# Patient Record
Sex: FEMALE | Race: WHITE | NOT HISPANIC OR LATINO | Employment: UNEMPLOYED | URBAN - METROPOLITAN AREA
[De-identification: names, ages, dates, MRNs, and addresses within clinical notes are randomized per-mention and may not be internally consistent; named-entity substitution may affect disease eponyms.]

---

## 2022-11-29 ENCOUNTER — OFFICE VISIT (OUTPATIENT)
Dept: ENDOCRINOLOGY | Facility: CLINIC | Age: 82
End: 2022-11-29

## 2022-11-29 VITALS
OXYGEN SATURATION: 98 % | BODY MASS INDEX: 23.05 KG/M2 | HEIGHT: 64 IN | WEIGHT: 135 LBS | DIASTOLIC BLOOD PRESSURE: 60 MMHG | HEART RATE: 62 BPM | SYSTOLIC BLOOD PRESSURE: 130 MMHG

## 2022-11-29 DIAGNOSIS — E06.3 HASHIMOTO'S THYROIDITIS: Primary | ICD-10-CM

## 2022-11-29 PROCEDURE — 99204 OFFICE O/P NEW MOD 45 MIN: CPT | Performed by: INTERNAL MEDICINE

## 2022-11-29 RX ORDER — FAMOTIDINE 20 MG/1
TABLET, FILM COATED ORAL
COMMUNITY
Start: 2022-11-08

## 2022-11-29 RX ORDER — LEVOTHYROXINE SODIUM 0.05 MG/1
50 TABLET ORAL DAILY
Qty: 90 TABLET | Refills: 3 | Status: SHIPPED | OUTPATIENT
Start: 2022-11-29 | End: 2023-03-31

## 2022-11-29 RX ORDER — LEVOCETIRIZINE DIHYDROCHLORIDE 5 MG/1
TABLET, FILM COATED ORAL
COMMUNITY
Start: 2022-10-04

## 2022-11-29 RX ORDER — MONTELUKAST SODIUM 10 MG/1
TABLET ORAL
COMMUNITY
Start: 2022-11-08 | End: 2023-03-31

## 2022-11-29 RX ORDER — NIFEDIPINE 30 MG/1
TABLET, EXTENDED RELEASE ORAL
COMMUNITY
Start: 2022-11-08

## 2022-11-29 NOTE — ASSESSMENT & PLAN NOTE
She has Hashimoto's thyroiditis that may be contributing to the urticaria.  She has TSH of 4 with low free T4.  We discussed treatment with T4 to see if this helps with the urticaria.  Goal would be low-normal TSH.    We also discussed seeing allergist about xolair injections.

## 2022-11-29 NOTE — PROGRESS NOTES
Office Note      Date: 2022  Patient Name: Alecia Sevilla  MRN: [id number]  : 1940    Chief Complaint   Patient presents with   •  Anti-TPO antibodies present       History of Present Illness:   Alecia Sevilla is a 82 y.o. female who presents for  Anti-TPO antibodies present    She denies any prior h/o thyroid disease.  She hasn't taken any thyroid meds.  She has had issues with urticaria for about a year and 1/2.  This seemed to develop after COVID-19 infection.  She has been treated with steroids periodically for this. She was referred to allergist/immunologist.  She has been started on singulair, xyzal, and H2 blockers.  She had labs done that showed positive TPO abs.  She had labs done about 2 weeks ago with her PCP.  The TSH was 4.12.  The free T4 was mildly low at 0.74 (LLN 0.93.)  She denies any h/o hypo- or hyperthyroidism.  She hasn't taken any thyroid meds.  She denies any h/o goiter.  She hasn't had any thyroid imaging.  She hasn't noted any change in the size of her neck.  She notes occ trouble swallowing.  She notes constipation.  She denies any other sxs of hypo- or hyperthyroidism at this time.      Subjective      Patient was born where: [place].  Facial radiation exposure: No.  High iodine intake: No  Family hx of thyroid disease: Yes, describe: daughter.    Review of Systems:   Review of Systems   Constitutional: Negative.    HENT: Positive for facial swelling, tinnitus and trouble swallowing.    Eyes: Positive for photophobia and itching.   Respiratory: Negative.    Cardiovascular: Positive for palpitations and leg swelling.   Gastrointestinal: Negative.         Heartburn/Reflux   Endocrine: Negative.    Genitourinary: Positive for enuresis.   Musculoskeletal: Positive for arthralgias, joint swelling and myalgias.   Skin: Positive for rash.   Allergic/Immunologic: Positive for environmental allergies.   Neurological: Negative.    Hematological: Bruises/bleeds easily.  "  Psychiatric/Behavioral: Positive for sleep disturbance.       The following portions of the patient's history were reviewed and updated as appropriate: allergies, current medications, past family history, past medical history, past social history, past surgical history and problem list.    Objective     Visit Vitals  /60   Pulse 62   Ht 162.6 cm (64\")   Wt 61.2 kg (135 lb)   SpO2 98%   BMI 23.17 kg/m²       Physical Exam:  Physical Exam  Constitutional:       Appearance: Normal appearance.   HENT:      Head: Normocephalic and atraumatic.   Eyes:      Extraocular Movements: Extraocular movements intact.      Conjunctiva/sclera: Conjunctivae normal.      Pupils: Pupils are equal, round, and reactive to light.   Neck:      Thyroid: No thyroid mass, thyromegaly or thyroid tenderness.      Comments: Thyroid firm and upper normal size with no palpable nodules  Cardiovascular:      Rate and Rhythm: Normal rate and regular rhythm.      Pulses: Normal pulses.      Heart sounds: Normal heart sounds.   Pulmonary:      Effort: Pulmonary effort is normal.      Breath sounds: Normal breath sounds.   Abdominal:      General: Bowel sounds are normal.      Palpations: Abdomen is soft.   Musculoskeletal:         General: Normal range of motion.      Cervical back: Normal range of motion and neck supple.      Right lower le+ Edema present.      Left lower le+ Edema present.   Lymphadenopathy:      Cervical: No cervical adenopathy.   Skin:     General: Skin is warm and dry.      Findings: Rash present. Rash is urticarial.      Comments: On back and wrist   Neurological:      General: No focal deficit present.      Mental Status: She is alert.   Psychiatric:         Mood and Affect: Mood normal.         Behavior: Behavior normal.         Thought Content: Thought content normal.         Judgment: Judgment normal.         Labs:    TSH  No results found for: TSHBASE     Free T4  No results found for: FREET4    T3  No " results found for: [id number]      TPO  No results found for: THYROIDAB    TG AB  No results found for: THGAB    TG  No results found for: THYROGLB    CBC w/DIFF  No results found for: WBC, RBC, HGB, HCT, MCV, MCH, MCHC, RDW, RDWSD, MPV, PLT, NEUTRORELPCT, LYMPHORELPCT, MONORELPCT, EOSRELPCT, BASORELPCT, AUTOIGPER, NEUTROABS, LYMPHSABS, MONOSABS, EOSABS, BASOSABS, AUTOIGNUM, NRBC        Assessment / Plan      Assessment & Plan:  Diagnoses and all orders for this visit:    1. Hashimoto's thyroiditis (Primary)  Assessment & Plan:  She has Hashimoto's thyroiditis that may be contributing to the urticaria.  She has TSH of 4 with low free T4.  We discussed treatment with T4 to see if this helps with the urticaria.  Goal would be low-normal TSH.    We also discussed seeing allergist about xolair injections.      Other orders  -     levothyroxine (SYNTHROID, LEVOTHROID) 50 MCG tablet; Take 1 tablet by mouth Daily.  Dispense: 90 tablet; Refill: 3       Return in about 3 months (around 2/28/2023) for Recheck with TSH, free T4.    Handy Herrera MD   11/29/2022

## 2022-12-29 ENCOUNTER — TELEPHONE (OUTPATIENT)
Dept: ENDOCRINOLOGY | Facility: CLINIC | Age: 82
End: 2022-12-29

## 2022-12-29 RX ORDER — LEVOTHYROXINE SODIUM 50 MCG
50 TABLET ORAL DAILY
Qty: 30 TABLET | Refills: 11 | Status: SHIPPED | OUTPATIENT
Start: 2022-12-29 | End: 2023-02-09 | Stop reason: SDUPTHER

## 2022-12-29 NOTE — TELEPHONE ENCOUNTER
"Patient called stated her pharmacy has sent her the generic for Synthroid and she is breaking out with \"itchy\" spots all over. She wants to know if we can send in rx where they cannot fill generic levothyroxine and only get name brand. Please advise.   "

## 2023-02-09 RX ORDER — LEVOTHYROXINE SODIUM 50 MCG
50 TABLET ORAL DAILY
Qty: 90 TABLET | Refills: 1 | Status: SHIPPED | OUTPATIENT
Start: 2023-02-09 | End: 2023-02-14 | Stop reason: SDUPTHER

## 2023-02-14 RX ORDER — LEVOTHYROXINE SODIUM 50 MCG
50 TABLET ORAL DAILY
Qty: 90 TABLET | Refills: 1 | Status: SHIPPED | OUTPATIENT
Start: 2023-02-14 | End: 2023-02-15 | Stop reason: SDUPTHER

## 2023-02-15 ENCOUNTER — TELEPHONE (OUTPATIENT)
Dept: ENDOCRINOLOGY | Facility: CLINIC | Age: 83
End: 2023-02-15
Payer: MEDICARE

## 2023-02-15 RX ORDER — LEVOTHYROXINE SODIUM 50 MCG
50 TABLET ORAL DAILY
Qty: 90 TABLET | Refills: 0 | Status: SHIPPED | OUTPATIENT
Start: 2023-02-15 | End: 2023-04-02 | Stop reason: DRUGHIGH

## 2023-02-15 NOTE — TELEPHONE ENCOUNTER
PT CALLED STATING [facility] CONTACTED THE PT IN REGARDS TO SYNTHROID RX WE SENT IN. PT STATED [facility] WILL NOT FILL RX UNTIL THEY RECEIVE INSTRUCTIONS FOR SYNTHROID RX. PT REQUESTED WE LOOK INTO THIS THEN REACH OUT TO HER.

## 2023-03-31 ENCOUNTER — OFFICE VISIT (OUTPATIENT)
Dept: ENDOCRINOLOGY | Facility: CLINIC | Age: 83
End: 2023-03-31
Payer: MEDICARE

## 2023-03-31 VITALS
HEART RATE: 65 BPM | SYSTOLIC BLOOD PRESSURE: 132 MMHG | WEIGHT: 135 LBS | BODY MASS INDEX: 23.05 KG/M2 | OXYGEN SATURATION: 98 % | HEIGHT: 64 IN | DIASTOLIC BLOOD PRESSURE: 62 MMHG

## 2023-03-31 DIAGNOSIS — E06.3 HASHIMOTO'S THYROIDITIS: Primary | ICD-10-CM

## 2023-03-31 PROCEDURE — 1159F MED LIST DOCD IN RCRD: CPT | Performed by: INTERNAL MEDICINE

## 2023-03-31 PROCEDURE — 99213 OFFICE O/P EST LOW 20 MIN: CPT | Performed by: INTERNAL MEDICINE

## 2023-03-31 PROCEDURE — 1160F RVW MEDS BY RX/DR IN RCRD: CPT | Performed by: INTERNAL MEDICINE

## 2023-03-31 NOTE — ASSESSMENT & PLAN NOTE
Continue synthroid.  Check TSH today.  Goal is low normal TSH to see if this will help with urticaria.  We discussed xolair injections but she is reluctant to try this.

## 2023-03-31 NOTE — PROGRESS NOTES
"     Office Note      Date: 2023  Patient Name: Alecia Sevilla  MRN: [id number]  : 1940    Chief Complaint   Patient presents with   • Hashimoto's Thyroiditis       History of Present Illness:   Alecia Sevilla is a 82 y.o. female who presents for Hashimoto's Thyroiditis    She has h/o Hashimoto's thyroiditis.   At the last visit we started treatment with synthroid.  She is taking 50mcg qd.  She is taking this correctly.  She isn't taking any interfering meds concurrently.  She hasn't noted any change in the size of her neck.  She notes occ trouble swallowing.  She notes constipation.  She denies any other sxs of hypo- or hyperthyroidism at this time.  She is still having some urticaria.  This hasn't changed.    Subjective      Review of Systems:   Review of Systems   Constitutional: Negative.    Cardiovascular: Negative.    Gastrointestinal: Negative.    Endocrine: Negative.        The following portions of the patient's history were reviewed and updated as appropriate: allergies, current medications, past family history, past medical history, past social history, past surgical history and problem list.    Objective     Visit Vitals  /62   Pulse 65   Ht 162.6 cm (64\")   Wt 61.2 kg (135 lb)   SpO2 98%   BMI 23.17 kg/m²       Physical Exam:  Physical Exam  Constitutional:       Appearance: Normal appearance.   Neck:      Thyroid: No thyroid mass, thyromegaly or thyroid tenderness.      Comments: Thyroid firm but normal size  Lymphadenopathy:      Cervical: No cervical adenopathy.   Neurological:      Mental Status: She is alert.         Labs:    TSH  No results found for: TSHBASE     Free T4  No results found for: FREET4    T3  No results found for: [id number]      TPO  No results found for: THYROIDAB    TG AB  No results found for: THGAB    TG  No results found for: THYROGLB    CBC w/DIFF  No results found for: WBC, RBC, HGB, HCT, MCV, MCH, MCHC, RDW, RDWSD, MPV, PLT, NEUTRORELPCT, LYMPHORELPCT, " MONORELPCT, EOSRELPCT, BASORELPCT, AUTOIGPER, NEUTROABS, LYMPHSABS, MONOSABS, EOSABS, BASOSABS, AUTOIGNUM, NRBC        Assessment / Plan      Assessment & Plan:  Diagnoses and all orders for this visit:    1. Hashimoto's thyroiditis (Primary)  Assessment & Plan:  Continue synthroid.  Check TSH today.  Goal is low normal TSH to see if this will help with urticaria.  We discussed xolair injections but she is reluctant to try this.    Orders:  -     TSH; Future  -     T4, Free; Future    Current Outpatient Medications   Medication Instructions   • famotidine (PEPCID) 20 MG tablet No dose, route, or frequency recorded.   • levocetirizine (XYZAL) 5 MG tablet No dose, route, or frequency recorded.   • NIFEdipine XL (PROCARDIA XL) 30 MG 24 hr tablet No dose, route, or frequency recorded.   • Synthroid 50 mcg, Oral, Daily      Return in about 3 months (around 6/30/2023) for Recheck with TSH.    Handy Herrera MD   03/31/2023

## 2023-04-01 LAB
T4 FREE SERPL-MCNC: 0.98 NG/DL (ref 0.82–1.77)
TSH SERPL DL<=0.005 MIU/L-ACNC: 5.57 UIU/ML (ref 0.45–4.5)

## 2023-04-02 RX ORDER — LEVOTHYROXINE SODIUM 75 MCG
75 TABLET ORAL DAILY
Qty: 90 TABLET | Refills: 3 | Status: SHIPPED | OUTPATIENT
Start: 2023-04-02

## 2023-05-08 RX ORDER — LEVOTHYROXINE SODIUM 75 MCG
75 TABLET ORAL DAILY
Qty: 90 TABLET | Refills: 3 | Status: SHIPPED | OUTPATIENT
Start: 2023-05-08

## 2023-05-08 NOTE — TELEPHONE ENCOUNTER
Spoke with patient.  She is requesting Synthroid to be sent to [facility].  Last office visit 03/31/2023.  Follow up scheduled for 07/26/2023.

## 2023-05-08 NOTE — TELEPHONE ENCOUNTER
Patient was calling to see if she can have her Synthroid sent to another pharmacy.  She stated that it will now go to [facility]  She was asking for a call back to discuss this and provide more information.

## 2023-06-16 ENCOUNTER — TELEPHONE (OUTPATIENT)
Dept: ENDOCRINOLOGY | Facility: CLINIC | Age: 83
End: 2023-06-16
Payer: MEDICARE

## 2023-06-16 NOTE — TELEPHONE ENCOUNTER
Caller: Alecia Sevilla    Relationship to patient: Self    Best call back number: [phone]  [phone]    Type of visit: IN PERSON FOLLOW UP    Requested date: EARLIER IN THE DAY 7/26/23      If rescheduling, when is the original appointment: 7/26/23  1:15PM    Additional notes: PATIENT WILL BE CARING FOR HER GRANDSON BEGINNING ON THIS DAY AND WILL NEED TO PICK HIM UP FROM CAMP. SHE WAS INQUIRING ABOUT GETTING A TIME EARLIER ON THAT SAME DAY, AN APPOINTMENT THE DAY BEFORE, OR EVEN ANY DAY BEFORE THEN. SHE WOULD LIKE A CALL BACK IF THIS IS POSSIBLE AND STATED IT IS OKAY TO LEAVE INFORMATION ON HER VOICEMAIL.

## 2023-06-19 NOTE — TELEPHONE ENCOUNTER
Called patient. No answer. Left her a detailed message letting her know Dr. Herrera does not currently have anything earlier on her current scheduled date, but I did let her know we could get her rescheduled with [facility] in August.

## 2023-07-26 ENCOUNTER — OFFICE VISIT (OUTPATIENT)
Dept: ENDOCRINOLOGY | Facility: CLINIC | Age: 83
End: 2023-07-26
Payer: MEDICARE

## 2023-07-26 VITALS
DIASTOLIC BLOOD PRESSURE: 68 MMHG | SYSTOLIC BLOOD PRESSURE: 150 MMHG | BODY MASS INDEX: 23.22 KG/M2 | WEIGHT: 136 LBS | HEART RATE: 72 BPM | HEIGHT: 64 IN | OXYGEN SATURATION: 98 %

## 2023-07-26 DIAGNOSIS — E03.9 HYPOTHYROIDISM (ACQUIRED): ICD-10-CM

## 2023-07-26 DIAGNOSIS — E06.3 HASHIMOTO'S THYROIDITIS: Primary | ICD-10-CM

## 2023-07-26 PROCEDURE — 1160F RVW MEDS BY RX/DR IN RCRD: CPT | Performed by: INTERNAL MEDICINE

## 2023-07-26 PROCEDURE — 36415 COLL VENOUS BLD VENIPUNCTURE: CPT | Performed by: INTERNAL MEDICINE

## 2023-07-26 PROCEDURE — 99213 OFFICE O/P EST LOW 20 MIN: CPT | Performed by: INTERNAL MEDICINE

## 2023-07-26 PROCEDURE — 1159F MED LIST DOCD IN RCRD: CPT | Performed by: INTERNAL MEDICINE

## 2023-07-26 NOTE — ASSESSMENT & PLAN NOTE
Continue Synthroid.  Check TSH.  Goal is low-normal TSH to see if this helps with urticaria.    She will need Rx sent to [facility].

## 2023-07-26 NOTE — PROGRESS NOTES
"     Office Note      Date: 2023  Patient Name: Alecia Sevilla  MRN: [id number]  : 1940    Chief Complaint   Patient presents with    Hashimoto's Thyroiditis       History of Present Illness:   Alecia Sevilla is a 82 y.o. female who presents for Hashimoto's Thyroiditis    She has h/o Hashimoto's thyroiditis.   At the last visit we increased the synthroid.  She is taking 75mcg qd.  She is taking this correctly.  She isn't taking any interfering meds concurrently.  She hasn't noted any change in the size of her neck.  She notes occ trouble swallowing.  She notes less constipation.  She denies any other sxs of hypo- or hyperthyroidism at this time.  She is still having some urticaria.  This hasn't changed.     Subjective      Review of Systems:   Review of Systems   Constitutional: Negative.    Cardiovascular: Negative.    Gastrointestinal:  Positive for constipation.   Endocrine: Negative.      The following portions of the patient's history were reviewed and updated as appropriate: allergies, current medications, past family history, past medical history, past social history, past surgical history, and problem list.    Objective     Visit Vitals  /68   Pulse 72   Ht 162.6 cm (64\")   Wt 61.7 kg (136 lb)   SpO2 98%   BMI 23.34 kg/m²       Physical Exam:  Physical Exam  Constitutional:       Appearance: Normal appearance.   Neck:      Thyroid: No thyroid mass, thyromegaly or thyroid tenderness.   Lymphadenopathy:      Cervical: No cervical adenopathy.   Neurological:      Mental Status: She is alert.       Labs:    TSH  No results found for: TSHBASE     Free T4  Free T4   Date Value Ref Range Status   2023 0.98 0.82 - 1.77 ng/dL Final       T3  No results found for: [id number]      TPO  No results found for: THYROIDAB    TG AB  No results found for: THGAB    TG  No results found for: THYROGLB    CBC w/DIFF  No results found for: WBC, RBC, HGB, HCT, MCV, MCH, MCHC, RDW, RDWSD, MPV, PLT, " NEUTRORELPCT, LYMPHORELPCT, MONORELPCT, EOSRELPCT, BASORELPCT, AUTOIGPER, NEUTROABS, LYMPHSABS, MONOSABS, EOSABS, BASOSABS, AUTOIGNUM, NRBC        Assessment / Plan      Assessment & Plan:  Diagnoses and all orders for this visit:    1. Hashimoto's thyroiditis (Primary)  Assessment & Plan:  Continue Synthroid.  Check TSH.  Goal is low-normal TSH to see if this helps with urticaria.    She will need Rx sent to [facility].    Orders:  -     TSH; Future    2. Hypothyroidism (acquired)  Assessment & Plan:  Continue T4 tx.        Current Outpatient Medications   Medication Instructions    famotidine (PEPCID) 20 MG tablet No dose, route, or frequency recorded.    levocetirizine (XYZAL) 5 MG tablet No dose, route, or frequency recorded.    NIFEdipine XL (PROCARDIA XL) 30 MG 24 hr tablet No dose, route, or frequency recorded.    Synthroid 75 mcg, Oral, Daily      Return in about 3 months (around 10/26/2023) for Recheck with TSH.    Handy Herrera MD   07/26/2023

## 2023-07-27 LAB — TSH SERPL DL<=0.005 MIU/L-ACNC: 1.39 UIU/ML (ref 0.27–4.2)

## 2023-07-27 RX ORDER — LEVOTHYROXINE SODIUM 88 MCG
88 TABLET ORAL DAILY
Qty: 90 TABLET | Refills: 3 | Status: SHIPPED | OUTPATIENT
Start: 2023-07-27

## 2024-05-07 ENCOUNTER — OFFICE VISIT (OUTPATIENT)
Dept: ENDOCRINOLOGY | Facility: CLINIC | Age: 84
End: 2024-05-07
Payer: MEDICARE

## 2024-05-07 VITALS
WEIGHT: 131 LBS | OXYGEN SATURATION: 99 % | HEART RATE: 62 BPM | BODY MASS INDEX: 22.36 KG/M2 | DIASTOLIC BLOOD PRESSURE: 68 MMHG | HEIGHT: 64 IN | SYSTOLIC BLOOD PRESSURE: 112 MMHG

## 2024-05-07 DIAGNOSIS — E06.3 HASHIMOTO'S THYROIDITIS: ICD-10-CM

## 2024-05-07 DIAGNOSIS — E03.9 HYPOTHYROIDISM (ACQUIRED): Primary | ICD-10-CM

## 2024-05-07 PROCEDURE — 36415 COLL VENOUS BLD VENIPUNCTURE: CPT | Performed by: INTERNAL MEDICINE

## 2024-05-07 PROCEDURE — 1159F MED LIST DOCD IN RCRD: CPT | Performed by: INTERNAL MEDICINE

## 2024-05-07 PROCEDURE — 1160F RVW MEDS BY RX/DR IN RCRD: CPT | Performed by: INTERNAL MEDICINE

## 2024-05-07 PROCEDURE — 99213 OFFICE O/P EST LOW 20 MIN: CPT | Performed by: INTERNAL MEDICINE

## 2024-05-07 RX ORDER — AZATHIOPRINE 50 MG/1
50 TABLET ORAL DAILY
COMMUNITY

## 2024-05-08 LAB — TSH SERPL DL<=0.005 MIU/L-ACNC: 2.48 UIU/ML (ref 0.27–4.2)

## 2024-05-08 RX ORDER — LEVOTHYROXINE SODIUM 100 MCG
100 TABLET ORAL DAILY
Qty: 90 TABLET | Refills: 3 | Status: SHIPPED | OUTPATIENT
Start: 2024-05-08

## 2024-11-20 ENCOUNTER — TELEPHONE (OUTPATIENT)
Dept: ENDOCRINOLOGY | Facility: CLINIC | Age: 84
End: 2024-11-20

## 2024-11-20 DIAGNOSIS — E03.9 HYPOTHYROIDISM (ACQUIRED): Primary | ICD-10-CM

## 2024-11-20 NOTE — TELEPHONE ENCOUNTER
Caller: Alecia Sevilla    Relationship: Self    Best call back number: [phone]    What orders are you requesting (i.e. lab or imaging): LABS    In what timeframe would the patient need to come in: ASAP    Where will you receive your lab/imaging services: OFFICE    Additional notes:

## 2024-12-06 ENCOUNTER — LAB (OUTPATIENT)
Dept: ENDOCRINOLOGY | Facility: CLINIC | Age: 84
End: 2024-12-06
Payer: MEDICARE

## 2024-12-06 DIAGNOSIS — E03.9 HYPOTHYROIDISM (ACQUIRED): ICD-10-CM

## 2024-12-06 LAB — TSH SERPL DL<=0.05 MIU/L-ACNC: 1.27 UIU/ML (ref 0.27–4.2)

## 2024-12-06 PROCEDURE — 36415 COLL VENOUS BLD VENIPUNCTURE: CPT | Performed by: INTERNAL MEDICINE

## 2024-12-06 PROCEDURE — 84443 ASSAY THYROID STIM HORMONE: CPT | Performed by: INTERNAL MEDICINE

## 2025-06-02 ENCOUNTER — OFFICE VISIT (OUTPATIENT)
Dept: ENDOCRINOLOGY | Facility: CLINIC | Age: 85
End: 2025-06-02
Payer: MEDICARE

## 2025-06-02 VITALS
HEART RATE: 65 BPM | BODY MASS INDEX: 21.95 KG/M2 | OXYGEN SATURATION: 97 % | WEIGHT: 128.6 LBS | HEIGHT: 64 IN | DIASTOLIC BLOOD PRESSURE: 62 MMHG | SYSTOLIC BLOOD PRESSURE: 134 MMHG

## 2025-06-02 DIAGNOSIS — E03.9 HYPOTHYROIDISM (ACQUIRED): Primary | ICD-10-CM

## 2025-06-02 DIAGNOSIS — E06.3 HASHIMOTO'S THYROIDITIS: ICD-10-CM

## 2025-06-02 PROCEDURE — 84443 ASSAY THYROID STIM HORMONE: CPT | Performed by: INTERNAL MEDICINE

## 2025-06-02 PROCEDURE — 1160F RVW MEDS BY RX/DR IN RCRD: CPT | Performed by: INTERNAL MEDICINE

## 2025-06-02 PROCEDURE — 36415 COLL VENOUS BLD VENIPUNCTURE: CPT | Performed by: INTERNAL MEDICINE

## 2025-06-02 PROCEDURE — 99213 OFFICE O/P EST LOW 20 MIN: CPT | Performed by: INTERNAL MEDICINE

## 2025-06-02 PROCEDURE — 1159F MED LIST DOCD IN RCRD: CPT | Performed by: INTERNAL MEDICINE

## 2025-06-02 RX ORDER — LOSARTAN POTASSIUM AND HYDROCHLOROTHIAZIDE 12.5; 5 MG/1; MG/1
1 TABLET ORAL DAILY
COMMUNITY
Start: 2025-05-08

## 2025-06-02 RX ORDER — LEVOTHYROXINE SODIUM 100 MCG
100 TABLET ORAL DAILY
Qty: 90 TABLET | Refills: 3 | Status: SHIPPED | OUTPATIENT
Start: 2025-06-02

## 2025-06-02 NOTE — PROGRESS NOTES
"     Office Note      Date: 2025  Patient Name: Alecia Sevilla  MRN: [id number]  : 1940    Chief Complaint   Patient presents with    Hashimoto's Thyroiditis    Hypothyroidism (acquired)       History of Present Illness:   Alecia Sevilla is a 84 y.o. female who presents for Hashimoto's Thyroiditis and Hypothyroidism (acquired)    She has h/o Hashimoto's thyroiditis.   She is currently taking Synthroid 100mcg qd.  She is taking this correctly.  She isn't taking any interfering meds concurrently.  She hasn't noted any change in the size of her neck.  She notes occ trouble swallowing.  She denies any sxs of hypo- or hyperthyroidism at this time.  She denies any recent urticaria.  She takes benadryl PRN.  She takes pepcid PRN.     Subjective      Review of Systems:   Review of Systems   Constitutional: Negative.    Cardiovascular: Negative.    Gastrointestinal: Negative.    Endocrine: Negative.        The following portions of the patient's history were reviewed and updated as appropriate: allergies, current medications, past family history, past medical history, past social history, past surgical history, and problem list.    Objective     Visit Vitals  /62 (BP Location: Left arm, Patient Position: Sitting)   Pulse 65   Ht 162.6 cm (64\")   Wt 58.3 kg (128 lb 9.6 oz)   SpO2 97%   BMI 22.07 kg/m²       Physical Exam:  Physical Exam  Constitutional:       Appearance: Normal appearance.   Neck:      Thyroid: No thyroid mass, thyromegaly or thyroid tenderness.   Lymphadenopathy:      Cervical: No cervical adenopathy.   Neurological:      Mental Status: She is alert.         Labs:    TSH  No results found for: \"TSHBASE\"     Free T4  Free T4   Date Value Ref Range Status   2023 0.98 0.82 - 1.77 ng/dL Final       T3  No results found for: \"[id number]\"      TPO  No results found for: \"THYROIDAB\"    TG AB  No results found for: \"THGAB\"    TG  No results found for: \"THYROGLB\"    CBC w/DIFF  Lab Results "   Component Value Date    WBC 4.6 03/03/2025    RBC 4.37 03/03/2025    HGB 13.5 03/03/2025    HCT 39.6 03/03/2025    MCV 90.7 03/03/2025    MCH 31.0 03/03/2025    MCHC 34.2 03/03/2025    RDW 14.9 03/03/2025    MPV 8.3 03/03/2025     03/03/2025    NEUTRORELPCT 71.2 (H) 03/03/2025    MONORELPCT 9.3 03/03/2025    EOSRELPCT 4.4 03/03/2025    BASORELPCT 0.7 03/03/2025    NEUTROABS 3.2 03/03/2025    LYMPHSABS 0.7 (L) 03/03/2025    MONOSABS 0.4 03/03/2025    EOSABS 0.2 03/03/2025    BASOSABS 0.0 03/03/2025           Assessment / Plan      Assessment & Plan:  Diagnoses and all orders for this visit:    1. Hypothyroidism (acquired) (Primary)  Assessment & Plan:  Continue Synthroid.  Check TSH.    Orders:  -     TSH; Future    2. Hashimoto's thyroiditis  Assessment & Plan:  No goiter.  We want the TSH on the lower side due to h/o urticaria.      Other orders  -     Synthroid 100 MCG tablet; Take 1 tablet by mouth Daily.  Dispense: 90 tablet; Refill: 3      Current Outpatient Medications   Medication Instructions    azaTHIOprine (IMURAN) 50 mg, Daily    famotidine (PEPCID) 20 MG tablet No dose, route, or frequency recorded.    losartan-hydrochlorothiazide (HYZAAR) 50-12.5 MG per tablet 1 tablet, Daily    NIFEdipine XL (PROCARDIA XL) 30 MG 24 hr tablet No dose, route, or frequency recorded.    Synthroid 100 mcg, Oral, Daily      Return in about 6 months (around 12/2/2025) for Recheck with TSH.    Electronically signed by: Handy Herrera MD  06/02/2025

## 2025-06-03 ENCOUNTER — RESULTS FOLLOW-UP (OUTPATIENT)
Dept: ENDOCRINOLOGY | Facility: CLINIC | Age: 85
End: 2025-06-03
Payer: MEDICARE

## 2025-06-03 LAB — TSH SERPL DL<=0.05 MIU/L-ACNC: 0.92 UIU/ML (ref 0.27–4.2)

## 2025-06-12 RX ORDER — LEVOTHYROXINE SODIUM 100 MCG
100 TABLET ORAL DAILY
Qty: 90 TABLET | Refills: 3 | Status: SHIPPED | OUTPATIENT
Start: 2025-06-12

## 2025-06-12 NOTE — TELEPHONE ENCOUNTER
Rx Refill Note  Requested Prescriptions     Pending Prescriptions Disp Refills    Synthroid 100 MCG tablet 90 tablet 3     Sig: Take 1 tablet by mouth Daily.      Last office visit with prescribing clinician: 6/2/2025     Next office visit with prescribing clinician: 1/23/2026     Jennifer Pérez MA  06/12/25, 11:46 EDT